# Patient Record
Sex: MALE | Race: WHITE | NOT HISPANIC OR LATINO | Employment: OTHER | URBAN - METROPOLITAN AREA
[De-identification: names, ages, dates, MRNs, and addresses within clinical notes are randomized per-mention and may not be internally consistent; named-entity substitution may affect disease eponyms.]

---

## 2018-07-16 NOTE — PATIENT DISCUSSION
DRY EYE OU:  I have explained that dry eye syndrome may cause many ocular symptoms including irritation, burning, tearing, and blurry vision. Frequent high quality artificial tears will help relieve these symptoms. Recommend patient use over the counter artificial tears at least four times daily. The patient was given a list of quality artificial tears to choose from (Genteal, Systane, Refresh, TheraTears, Blink) and was advised not to use Visine or Clear Eyes. Advised patient that if symptoms of discomfort did not improve or if they worse, then the patient should return to clinic. Will continue to monitor.

## 2022-07-29 ENCOUNTER — NEW PATIENT (OUTPATIENT)
Dept: URBAN - METROPOLITAN AREA CLINIC 50 | Facility: CLINIC | Age: 72
End: 2022-07-29

## 2022-07-29 DIAGNOSIS — H35.052: ICD-10-CM

## 2022-07-29 DIAGNOSIS — H35.373: ICD-10-CM

## 2022-07-29 DIAGNOSIS — Z79.4: ICD-10-CM

## 2022-07-29 DIAGNOSIS — H25.813: ICD-10-CM

## 2022-07-29 DIAGNOSIS — H40.2221: ICD-10-CM

## 2022-07-29 DIAGNOSIS — E11.9: ICD-10-CM

## 2022-07-29 DIAGNOSIS — H43.813: ICD-10-CM

## 2022-07-29 PROCEDURE — 92020 GONIOSCOPY: CPT

## 2022-07-29 PROCEDURE — 92004 COMPRE OPH EXAM NEW PT 1/>: CPT

## 2022-07-29 PROCEDURE — 92134 CPTRZ OPH DX IMG PST SGM RTA: CPT

## 2022-07-29 PROCEDURE — 76514 ECHO EXAM OF EYE THICKNESS: CPT

## 2022-07-29 RX ORDER — DORZOLAMIDE HYDROCHLORIDE TIMOLOL MALEATE 20; 5 MG/ML; MG/ML
1 SOLUTION/ DROPS OPHTHALMIC TWICE A DAY
Start: 2022-07-29

## 2022-07-29 ASSESSMENT — PACHYMETRY
OD_CT_UM: 529
OS_CT_UM: 535

## 2022-07-29 ASSESSMENT — KERATOMETRY
OS_AXISANGLE_DEGREES: 66
OD_K1POWER_DIOPTERS: 43.75
OS_AXISANGLE2_DEGREES: 156
OD_K2POWER_DIOPTERS: 44.25
OD_AXISANGLE2_DEGREES: 2
OS_K2POWER_DIOPTERS: 45.25
OD_AXISANGLE_DEGREES: 92
OS_K1POWER_DIOPTERS: 44.25

## 2022-07-29 ASSESSMENT — VISUAL ACUITY
OU_SC: 20/30-2
OD_CC: 20/30-2
OS_CC: 20/20-1
OD_SC: 20/30-2
OU_CC: J1+ @ 16"
OU_CC: 20/25-2
OS_SC: 20/100
OU_SC: J2 @ 16"

## 2022-07-29 ASSESSMENT — TONOMETRY
OS_IOP_MMHG: 30
OD_IOP_MMHG: 20
OD_IOP_MMHG: 21
OS_IOP_MMHG: 25
OS_IOP_MMHG: 20

## 2022-08-05 ENCOUNTER — DIAGNOSTICS ONLY (OUTPATIENT)
Dept: URBAN - METROPOLITAN AREA CLINIC 50 | Facility: CLINIC | Age: 72
End: 2022-08-05

## 2022-08-05 DIAGNOSIS — H40.2221: ICD-10-CM

## 2022-08-05 PROCEDURE — 92133 CPTRZD OPH DX IMG PST SGM ON: CPT

## 2022-08-05 PROCEDURE — 92083 EXTENDED VISUAL FIELD XM: CPT

## 2022-08-05 ASSESSMENT — KERATOMETRY
OD_K2POWER_DIOPTERS: 44.25
OS_K1POWER_DIOPTERS: 44.25
OS_AXISANGLE2_DEGREES: 156
OD_AXISANGLE2_DEGREES: 2
OS_K2POWER_DIOPTERS: 45.25
OD_AXISANGLE_DEGREES: 92
OS_AXISANGLE_DEGREES: 66
OD_K1POWER_DIOPTERS: 43.75

## 2022-09-23 ENCOUNTER — FOLLOW UP (OUTPATIENT)
Dept: URBAN - METROPOLITAN AREA CLINIC 50 | Facility: CLINIC | Age: 72
End: 2022-09-23

## 2022-09-23 DIAGNOSIS — H40.2221: ICD-10-CM

## 2022-09-23 PROCEDURE — 76514 ECHO EXAM OF EYE THICKNESS: CPT

## 2022-09-23 PROCEDURE — 92012 INTRM OPH EXAM EST PATIENT: CPT

## 2022-09-23 RX ORDER — LATANOPROST 50 UG/ML: 1 SOLUTION/ DROPS OPHTHALMIC EVERY EVENING

## 2022-09-23 ASSESSMENT — VISUAL ACUITY
OD_CC: 20/30-2
OU_CC: 20/25-2
OS_CC: 20/25-2

## 2022-09-23 ASSESSMENT — PACHYMETRY
OD_CT_UM: 521
OS_CT_UM: 539

## 2022-09-23 ASSESSMENT — TONOMETRY
OS_IOP_MMHG: 17
OD_IOP_MMHG: 18
OS_IOP_MMHG: 18
OD_IOP_MMHG: 19

## 2022-10-28 ENCOUNTER — FOLLOW UP (OUTPATIENT)
Dept: URBAN - METROPOLITAN AREA CLINIC 50 | Facility: CLINIC | Age: 72
End: 2022-10-28

## 2022-10-28 DIAGNOSIS — H40.2221: ICD-10-CM

## 2022-10-28 PROCEDURE — 92012 INTRM OPH EXAM EST PATIENT: CPT

## 2022-10-28 ASSESSMENT — TONOMETRY
OD_IOP_MMHG: 14
OS_IOP_MMHG: 09
OD_IOP_MMHG: 15
OS_IOP_MMHG: 10

## 2022-10-28 ASSESSMENT — VISUAL ACUITY
OD_CC: 20/40+1
OS_CC: 20/30